# Patient Record
Sex: FEMALE | Race: WHITE | NOT HISPANIC OR LATINO | URBAN - NONMETROPOLITAN AREA
[De-identification: names, ages, dates, MRNs, and addresses within clinical notes are randomized per-mention and may not be internally consistent; named-entity substitution may affect disease eponyms.]

---

## 2020-10-20 ENCOUNTER — OFFICE VISIT (OUTPATIENT)
Dept: URBAN - NONMETROPOLITAN AREA CLINIC 13 | Facility: CLINIC | Age: 70
End: 2020-10-20
Payer: MEDICARE

## 2020-10-20 DIAGNOSIS — C16.9 GASTRIC ADENOCARCINOMA: ICD-10-CM

## 2020-10-20 PROCEDURE — G9903 PT SCRN TBCO ID AS NON USER: HCPCS | Performed by: INTERNAL MEDICINE

## 2020-10-20 PROCEDURE — 99213 OFFICE O/P EST LOW 20 MIN: CPT | Performed by: INTERNAL MEDICINE

## 2020-10-20 RX ORDER — PANTOPRAZOLE SODIUM 40 MG/1
TAKE 1 TABLET (40 MG) BY ORAL ROUTE ONCE DAILY TABLET, DELAYED RELEASE ORAL 1
Qty: 0 | Refills: 0 | Status: ACTIVE | COMMUNITY
Start: 1900-01-01 | End: 1900-01-01

## 2020-10-20 RX ORDER — LOSARTAN POTASSIUM AND HYDROCHLOROTHIAZIDE 100; 25 MG/1; MG/1
TAKE 1 TABLET BY ORAL ROUTE ONCE DAILY TABLET, FILM COATED ORAL 1
Qty: 0 | Refills: 0 | Status: ACTIVE | COMMUNITY
Start: 1900-01-01 | End: 1900-01-01

## 2020-10-20 NOTE — HPI-TODAY'S VISIT:
History Of Present Illness    Ms. Leticia Galeana is here for hx of adenocarcinoma of the stomach. In 2015, she was undergoing evaluation for gastric bypass. 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 She had a repeat EGD with EUS in September 2015 that showed a diffuse distal gastric antrum flat, laterally spreading mass. Pathology consistent with adenocarcinoma. She was referred to [facility]. She had 2/3rds of her stomach removed 1/2016. She has a CT scan every 3 months. She had a repeat EGD 2/2017 with no evidence of gastric mass or malignancy. In April 2017, she had a SBO. She is having a ventral hernia repair at [facility] next month. She eats small amounts and and denies nausea, or vomiting.  She is very active and walks 6 miles a day.  6/18/19 Leticia retruns for follow up. EGD in 2018 was clear of recurrent disease, just some mild gastritis. Today she is feeling well. She is spending the summer in [place] starting in July but wanted to get repeat surveillance EGD done prior. She is having hernia surgery later this year and dental implants as well. She feels well [id number] Leticia returns for follow-up. She has a history of gastric cancer status post resection. She has had routine endoscopy and CT scans which have been normal. She is here for  repeat EGD. She has no issues today.

## 2020-10-20 NOTE — PHYSICAL EXAM HENT:
Head,  normocephalic,  atraumatic,  Face,  Face within normal limits,  Ears,  External ears within normal limits,  Nose/Nasopharynx,  External nose  normal appearance, Lips,  Appearance normal
Bladder non-tender and non-distended.

## 2020-10-30 ENCOUNTER — OFFICE VISIT (OUTPATIENT)
Dept: URBAN - METROPOLITAN AREA MEDICAL CENTER 1 | Facility: MEDICAL CENTER | Age: 70
End: 2020-10-30
Payer: MEDICARE

## 2020-10-30 DIAGNOSIS — Z85.028 HISTORY OF GASTRIC CANCER: ICD-10-CM

## 2020-10-30 DIAGNOSIS — K31.89 ACQUIRED DEFORMITY OF DUODENUM: ICD-10-CM

## 2020-10-30 PROCEDURE — 43239 EGD BIOPSY SINGLE/MULTIPLE: CPT | Performed by: INTERNAL MEDICINE

## 2020-10-30 PROCEDURE — 43235 EGD DIAGNOSTIC BRUSH WASH: CPT | Performed by: INTERNAL MEDICINE

## 2020-12-29 ENCOUNTER — OFFICE VISIT (OUTPATIENT)
Dept: URBAN - NONMETROPOLITAN AREA CLINIC 13 | Facility: CLINIC | Age: 70
End: 2020-12-29

## 2021-01-19 ENCOUNTER — OFFICE VISIT (OUTPATIENT)
Dept: URBAN - NONMETROPOLITAN AREA CLINIC 13 | Facility: CLINIC | Age: 71
End: 2021-01-19

## 2022-02-08 ENCOUNTER — OFFICE VISIT (OUTPATIENT)
Dept: URBAN - NONMETROPOLITAN AREA CLINIC 13 | Facility: CLINIC | Age: 72
End: 2022-02-08

## 2022-03-31 ENCOUNTER — WEB ENCOUNTER (OUTPATIENT)
Dept: URBAN - NONMETROPOLITAN AREA CLINIC 13 | Facility: CLINIC | Age: 72
End: 2022-03-31

## 2022-03-31 ENCOUNTER — OFFICE VISIT (OUTPATIENT)
Dept: URBAN - NONMETROPOLITAN AREA CLINIC 13 | Facility: CLINIC | Age: 72
End: 2022-03-31
Payer: MEDICARE

## 2022-03-31 DIAGNOSIS — C16.9 GASTRIC ADENOCARCINOMA: ICD-10-CM

## 2022-03-31 DIAGNOSIS — D12.6 ADENOMATOUS POLYP OF COLON, UNSPECIFIED PART OF COLON: ICD-10-CM

## 2022-03-31 PROCEDURE — 99214 OFFICE O/P EST MOD 30 MIN: CPT | Performed by: INTERNAL MEDICINE

## 2022-03-31 RX ORDER — LOSARTAN POTASSIUM AND HYDROCHLOROTHIAZIDE 100; 25 MG/1; MG/1
TAKE 1 TABLET BY ORAL ROUTE ONCE DAILY TABLET, FILM COATED ORAL 1
Qty: 0 | Refills: 0 | Status: ACTIVE | COMMUNITY
Start: 1900-01-01 | End: 1900-01-01

## 2022-03-31 RX ORDER — PANTOPRAZOLE SODIUM 40 MG/1
TAKE 1 TABLET (40 MG) BY ORAL ROUTE ONCE DAILY TABLET, DELAYED RELEASE ORAL 1
Qty: 0 | Refills: 0 | Status: ACTIVE | COMMUNITY
Start: 1900-01-01 | End: 1900-01-01

## 2022-03-31 NOTE — HPI-TODAY'S VISIT:
History Of Present Illness    Ms. Leticia Galeana is here for hx of adenocarcinoma of the stomach. In 2015, she was undergoing evaluation for gastric bypass. 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 She had a repeat EGD with [facility] in September 2015 that showed a diffuse distal gastric antrum flat, laterally spreading mass. Pathology consistent with adenocarcinoma. She was referred to [place]. She had 2/3rds of her stomach removed 1/2016. She has a CT scan every 3 months. She had a repeat EGD 2/2017 with no evidence of gastric mass or malignancy. In April 2017, she had a SBO. She is having a ventral hernia repair at [facility] next month. She eats small amounts and and denies nausea, or vomiting.  She is very active and walks 6 miles a day.  6/18/19 Leticia retruns for follow up. EGD in 2018 was clear of recurrent disease, just some mild gastritis. Today she is feeling well. She is spending the summer in [place] starting in July but wanted to get repeat surveillance EGD done prior. She is having hernia surgery later this year and dental implants as well. She feels well [id number] Leticia returns for follow-up. She has a history of gastric cancer status post resection. She has had routine endoscopy and CT scans which have been normal. She is here for  repeat EGD. She has no issues today. 3/31/2022 Leticia returns for follow-up.  She is due for upper endoscopy for surveillance of gastric cancer.  She has a history of partial gastric resection.  Past EGDs for surveillance have been unrevealing.  She is also due for colonoscopy.  She had a colonoscopy 5 years ago with benign polyps.  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

## 2022-04-27 ENCOUNTER — CLAIMS CREATED FROM THE CLAIM WINDOW (OUTPATIENT)
Dept: URBAN - METROPOLITAN AREA CLINIC 4 | Facility: CLINIC | Age: 72
End: 2022-04-27
Payer: MEDICARE

## 2022-04-27 ENCOUNTER — OFFICE VISIT (OUTPATIENT)
Dept: URBAN - NONMETROPOLITAN AREA SURGERY CENTER 1 | Facility: SURGERY CENTER | Age: 72
End: 2022-04-27
Payer: MEDICARE

## 2022-04-27 DIAGNOSIS — K31.89 FOCAL FOVEOLAR HYPERPLASIA: ICD-10-CM

## 2022-04-27 DIAGNOSIS — Z98.0 H/O BILLROTH II OPERATION: ICD-10-CM

## 2022-04-27 DIAGNOSIS — Z12.11 COLON CANCER SCREENING: ICD-10-CM

## 2022-04-27 DIAGNOSIS — C16.9 ADENOCARCINOMA OF STOMACH: ICD-10-CM

## 2022-04-27 PROCEDURE — 43239 EGD BIOPSY SINGLE/MULTIPLE: CPT | Performed by: INTERNAL MEDICINE

## 2022-04-27 PROCEDURE — 88342 IMHCHEM/IMCYTCHM 1ST ANTB: CPT | Performed by: PATHOLOGY

## 2022-04-27 PROCEDURE — 88341 IMHCHEM/IMCYTCHM EA ADD ANTB: CPT | Performed by: PATHOLOGY

## 2022-04-27 PROCEDURE — G8907 PT DOC NO EVENTS ON DISCHARG: HCPCS | Performed by: INTERNAL MEDICINE

## 2022-04-27 PROCEDURE — G0121 COLON CA SCRN NOT HI RSK IND: HCPCS | Performed by: INTERNAL MEDICINE

## 2022-04-27 PROCEDURE — 88305 TISSUE EXAM BY PATHOLOGIST: CPT | Performed by: PATHOLOGY

## 2022-06-09 ENCOUNTER — OFFICE VISIT (OUTPATIENT)
Dept: URBAN - NONMETROPOLITAN AREA CLINIC 13 | Facility: CLINIC | Age: 72
End: 2022-06-09
Payer: MEDICARE

## 2022-06-09 VITALS
HEIGHT: 65 IN | BODY MASS INDEX: 39.95 KG/M2 | TEMPERATURE: 97.6 F | SYSTOLIC BLOOD PRESSURE: 155 MMHG | WEIGHT: 239.8 LBS | HEART RATE: 70 BPM | DIASTOLIC BLOOD PRESSURE: 76 MMHG

## 2022-06-09 DIAGNOSIS — D12.6 ADENOMATOUS POLYP OF COLON, UNSPECIFIED PART OF COLON: ICD-10-CM

## 2022-06-09 DIAGNOSIS — C16.9 GASTRIC ADENOCARCINOMA: ICD-10-CM

## 2022-06-09 PROBLEM — 408647009 ADENOCARCINOMA OF STOMACH: Status: ACTIVE | Noted: 2020-10-20

## 2022-06-09 PROCEDURE — 99213 OFFICE O/P EST LOW 20 MIN: CPT | Performed by: NURSE PRACTITIONER

## 2022-06-09 RX ORDER — PANTOPRAZOLE SODIUM 40 MG/1
TAKE 1 TABLET (40 MG) BY ORAL ROUTE ONCE DAILY TABLET, DELAYED RELEASE ORAL 1
Qty: 0 | Refills: 0 | Status: ACTIVE | COMMUNITY
Start: 1900-01-01 | End: 1900-01-01

## 2022-06-09 RX ORDER — LOSARTAN POTASSIUM AND HYDROCHLOROTHIAZIDE 100; 25 MG/1; MG/1
TAKE 1 TABLET BY ORAL ROUTE ONCE DAILY TABLET, FILM COATED ORAL 1
Qty: 0 | Refills: 0 | Status: ACTIVE | COMMUNITY
Start: 1900-01-01 | End: 1900-01-01

## 2022-06-09 NOTE — HPI-OTHER HISTORIES
History Of Present Illness    Ms. Leticia Galeana is here for hx of adenocarcinoma of the stomach. In 2015, she was undergoing evaluation for gastric bypass. 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 She had a repeat EGD with [facility] in September 2015 that showed a diffuse distal gastric antrum flat, laterally spreading mass. Pathology consistent with adenocarcinoma. She was referred to [facility]. She had 2/3rds of her stomach removed 1/2016. She has a CT scan every 3 months. She had a repeat EGD 2/2017 with no evidence of gastric mass or malignancy. In April 2017, she had a SBO. She is having a ventral hernia repair at [facility] next month. She eats small amounts and and denies nausea, or vomiting.  She is very active and walks 6 miles a day.  6/18/19 Leticia retruns for follow up. EGD in 2018 was clear of recurrent disease, just some mild gastritis. Today she is feeling well. She is spending the summer in [place] starting in July but wanted to get repeat surveillance EGD done prior. She is having hernia surgery later this year and dental implants as well. She feels well [id number] Leticia returns for follow-up. She has a history of gastric cancer status post resection. She has had routine endoscopy and CT scans which have been normal. She is here for  repeat EGD. She has no issues today. 3/31/2022 Leticia returns for follow-up.  She is due for upper endoscopy for surveillance of gastric cancer.  She has a history of partial gastric resection.  Past EGDs for surveillance have been unrevealing.  She is also due for colonoscopy.  She had a colonoscopy 5 years ago with benign polyps.  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  4/27/2022 EGD: healthy Billroth 1 gastroduodenostomy, no masses, lesions, or abnormal mucosa. Path negative Colonoscopy: diverticulosis leftsided

## 2022-06-09 NOTE — HPI-TODAY'S VISIT:
6/9/2022 Leticia returns for follow-up.  She had an upper endoscopy for surveillance of gastric cancer.  She has a history of partial gastric resection.  Past EGDs and this last one 4/2022 for surveillance have been unrevealing. She denies any abdominal pain, nausea, vomiting, change in weight or appetite. She also has a hx  oftubular adenomas with in situ carcinoma. Her colonoscopy was normal except for diverticulosis. overall, she feels well and has no GI complaints. CS

## 2023-06-22 ENCOUNTER — OFFICE VISIT (OUTPATIENT)
Dept: URBAN - NONMETROPOLITAN AREA CLINIC 13 | Facility: CLINIC | Age: 73
End: 2023-06-22
Payer: MEDICARE

## 2023-06-22 ENCOUNTER — DASHBOARD ENCOUNTERS (OUTPATIENT)
Age: 73
End: 2023-06-22

## 2023-06-22 VITALS
BODY MASS INDEX: 41.85 KG/M2 | HEART RATE: 67 BPM | WEIGHT: 251.2 LBS | DIASTOLIC BLOOD PRESSURE: 78 MMHG | SYSTOLIC BLOOD PRESSURE: 153 MMHG | HEIGHT: 65 IN

## 2023-06-22 DIAGNOSIS — C16.9 GASTRIC ADENOCARCINOMA: ICD-10-CM

## 2023-06-22 PROCEDURE — 99213 OFFICE O/P EST LOW 20 MIN: CPT | Performed by: INTERNAL MEDICINE

## 2023-06-22 RX ORDER — PANTOPRAZOLE SODIUM 40 MG/1
TAKE 1 TABLET (40 MG) BY ORAL ROUTE ONCE DAILY TABLET, DELAYED RELEASE ORAL 1
Qty: 0 | Refills: 0 | Status: ACTIVE | COMMUNITY
Start: 1900-01-01

## 2023-06-22 RX ORDER — LOSARTAN POTASSIUM AND HYDROCHLOROTHIAZIDE 100; 25 MG/1; MG/1
TAKE 1 TABLET BY ORAL ROUTE ONCE DAILY TABLET, FILM COATED ORAL 1
Qty: 0 | Refills: 0 | Status: ACTIVE | COMMUNITY
Start: 1900-01-01

## 2023-06-22 NOTE — HPI-OTHER HISTORIES
History Of Present Illness    Ms. Leticia Galeana is here for hx of adenocarcinoma of the stomach. In 2015, she was undergoing evaluation for gastric bypass. 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 She had a repeat EGD with [facility] in September 2015 that showed a diffuse distal gastric antrum flat, laterally spreading mass. Pathology consistent with adenocarcinoma. She was referred to [facility]. She had 2/3rds of her stomach removed 1/2016. She has a CT scan every 3 months. She had a repeat EGD 2/2017 with no evidence of gastric mass or malignancy. In April 2017, she had a SBO. She is having a ventral hernia repair at [facility] next month. She eats small amounts and and denies nausea, or vomiting.  She is very active and walks 6 miles a day.  6/18/19 Leticia retruns for follow up. EGD in 2018 was clear of recurrent disease, just some mild gastritis. Today she is feeling well. She is spending the summer in [place] starting in July but wanted to get repeat surveillance EGD done prior. She is having hernia surgery later this year and dental implants as well. She feels well [id number] Leticia returns for follow-up. She has a history of gastric cancer status post resection. She has had routine endoscopy and CT scans which have been normal. She is here for  repeat EGD. She has no issues today. 3/31/2022 Leticia returns for follow-up.  She is due for upper endoscopy for surveillance of gastric cancer.  She has a history of partial gastric resection.  Past EGDs for surveillance have been unrevealing.  She is also due for colonoscopy.  She had a colonoscopy 5 years ago with benign polyps.  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  4/27/2022 EGD: healthy Billroth 1 gastroduodenostomy, no masses, lesions, or abnormal mucosa. Path negative Colonoscopy: diverticulosis leftsided

## 2023-06-22 NOTE — HPI-TODAY'S VISIT:
6/9/2022 Leticia returns for follow-up.  She had an upper endoscopy for surveillance of gastric cancer.  She has a history of partial gastric resection.  Past EGDs and this last one 4/2022 for surveillance have been unrevealing. She denies any abdominal pain, nausea, vomiting, change in weight or appetite. She also has a hx  oftubular adenomas with in situ carcinoma. Her colonoscopy was normal except for diverticulosis. overall, she feels well and has no GI complaints. CS 6/22/2023 Leticia returns for follow-up.  She has a history of gastric adenocarcinoma status post resection.  She has routine surveillance imaging and endoscopy that has been clear of disease.  She recently saw Dr. Harris who released her.  She does want to continue yearly endoscopies for surveillance.  She otherwise feels well without complaints today.  She is retired and spends a lot of time in her garden and yard.

## 2023-08-02 ENCOUNTER — OFFICE VISIT (OUTPATIENT)
Dept: URBAN - NONMETROPOLITAN AREA SURGERY CENTER 1 | Facility: SURGERY CENTER | Age: 73
End: 2023-08-02
Payer: MEDICARE

## 2023-08-02 ENCOUNTER — CLAIMS CREATED FROM THE CLAIM WINDOW (OUTPATIENT)
Dept: URBAN - METROPOLITAN AREA CLINIC 4 | Facility: CLINIC | Age: 73
End: 2023-08-02
Payer: MEDICARE

## 2023-08-02 DIAGNOSIS — Z98.0 H/O BILLROTH II OPERATION: ICD-10-CM

## 2023-08-02 DIAGNOSIS — K31.89 OTHER DISEASES OF STOMACH AND DUODENUM: ICD-10-CM

## 2023-08-02 DIAGNOSIS — C16.9 ADENOCARCINOMA OF STOMACH: ICD-10-CM

## 2023-08-02 PROCEDURE — G8907 PT DOC NO EVENTS ON DISCHARG: HCPCS | Performed by: INTERNAL MEDICINE

## 2023-08-02 PROCEDURE — 88305 TISSUE EXAM BY PATHOLOGIST: CPT | Performed by: PATHOLOGY

## 2023-08-02 PROCEDURE — 43239 EGD BIOPSY SINGLE/MULTIPLE: CPT | Performed by: INTERNAL MEDICINE

## 2023-10-03 ENCOUNTER — TELEPHONE ENCOUNTER (OUTPATIENT)
Dept: URBAN - METROPOLITAN AREA CLINIC 23 | Facility: CLINIC | Age: 73
End: 2023-10-03

## 2024-10-08 ENCOUNTER — TELEPHONE ENCOUNTER (OUTPATIENT)
Dept: URBAN - NONMETROPOLITAN AREA CLINIC 13 | Facility: CLINIC | Age: 74
End: 2024-10-08

## 2025-01-02 ENCOUNTER — LAB OUTSIDE AN ENCOUNTER (OUTPATIENT)
Dept: URBAN - NONMETROPOLITAN AREA CLINIC 13 | Facility: CLINIC | Age: 75
End: 2025-01-02

## 2025-01-02 ENCOUNTER — OFFICE VISIT (OUTPATIENT)
Dept: URBAN - NONMETROPOLITAN AREA CLINIC 13 | Facility: CLINIC | Age: 75
End: 2025-01-02
Payer: MEDICARE

## 2025-01-02 VITALS
SYSTOLIC BLOOD PRESSURE: 149 MMHG | BODY MASS INDEX: 38.69 KG/M2 | WEIGHT: 232.2 LBS | DIASTOLIC BLOOD PRESSURE: 83 MMHG | HEIGHT: 65 IN | HEART RATE: 80 BPM

## 2025-01-02 DIAGNOSIS — Z80.0 FAMILY HISTORY OF COLON CANCER: ICD-10-CM

## 2025-01-02 DIAGNOSIS — D12.6 ADENOMATOUS POLYP OF COLON, UNSPECIFIED PART OF COLON: ICD-10-CM

## 2025-01-02 DIAGNOSIS — Z12.11 COLON CANCER SCREENING: ICD-10-CM

## 2025-01-02 DIAGNOSIS — C16.9 GASTRIC ADENOCARCINOMA: ICD-10-CM

## 2025-01-02 PROCEDURE — 99213 OFFICE O/P EST LOW 20 MIN: CPT | Performed by: INTERNAL MEDICINE

## 2025-01-02 RX ORDER — PANTOPRAZOLE SODIUM 40 MG/1
TAKE 1 TABLET (40 MG) BY ORAL ROUTE ONCE DAILY TABLET, DELAYED RELEASE ORAL 1
Qty: 0 | Refills: 0 | Status: ACTIVE | COMMUNITY
Start: 1900-01-01

## 2025-01-02 RX ORDER — LOSARTAN POTASSIUM AND HYDROCHLOROTHIAZIDE 100; 25 MG/1; MG/1
TAKE 1 TABLET BY ORAL ROUTE ONCE DAILY TABLET, FILM COATED ORAL 1
Qty: 0 | Refills: 0 | Status: ACTIVE | COMMUNITY
Start: 1900-01-01

## 2025-01-02 NOTE — HPI-TODAY'S VISIT:
6/9/2022 Leticia returns for follow-up.  She had an upper endoscopy for surveillance of gastric cancer.  She has a history of partial gastric resection.  Past EGDs and this last one 4/2022 for surveillance have been unrevealing. She denies any abdominal pain, nausea, vomiting, change in weight or appetite. She also has a hx  oftubular adenomas with in situ carcinoma. Her colonoscopy was normal except for diverticulosis. overall, she feels well and has no GI complaints. CS 6/22/2023 Leticia returns for follow-up.  She has a history of gastric adenocarcinoma status post resection.  She has routine surveillance imaging and endoscopy that has been clear of disease.  She recently saw Dr. Harris who released her.  She does want to continue yearly endoscopies for surveillance.  She otherwise feels well without complaints today.  She is retired and spends a lot of time in her garden and yard. 1/2/2025 Leticia returns for follow-up.  She has a history of gastric adenocarcinoma status post resection.  She has been undergoing routine surveillance endoscopies.  Last endoscopy in 2023 was normal.  She just tells me that her sister was just diagnosed with metastatic colon cancer.  She is interested in repeating colonoscopy as her sister had a colonoscopy 5 years prior that was reportedly normal.  She denies any rectal bleeding or abdominal pain.

## 2025-01-02 NOTE — HPI-OTHER HISTORIES
History Of Present Illness    Ms. Leticia Galeana is here for hx of adenocarcinoma of the stomach. In 2015, she was undergoing evaluation for gastric bypass. She had an EGD that showed inflammation in the stomach and bx were sent to 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 She had a repeat EGD with [facility] in September 2015 that showed a diffuse distal gastric antrum flat, laterally spreading mass. Pathology consistent with adenocarcinoma. She was referred to [place]. She had 2/3rds of her stomach removed 1/2016. She has a CT scan every 3 months. She had a repeat EGD 2/2017 with no evidence of gastric mass or malignancy. In April 2017, she had a SBO. She is having a ventral hernia repair at [place] next month. She eats small amounts and and denies nausea, or vomiting.  She is very active and walks 6 miles a day.  6/18/19 Leticia retruns for follow up. EGD in 2018 was clear of recurrent disease, just some mild gastritis. Today she is feeling well. She is spending the summer in [place] starting in July but wanted to get repeat surveillance EGD done prior. She is having hernia surgery later this year and dental implants as well. She feels well [id number] Leticia returns for follow-up. She has a history of gastric cancer status post resection. She has had routine endoscopy and CT scans which have been normal. She is here for  repeat EGD. She has no issues today. 3/31/2022 Leticia returns for follow-up.  She is due for upper endoscopy for surveillance of gastric cancer.  She has a history of partial gastric resection.  Past EGDs for surveillance have been unrevealing.  She is also due for colonoscopy.  She had a colonoscopy 5 years ago with benign polyps.  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  4/27/2022 EGD: healthy Billroth 1 gastroduodenostomy, no masses, lesions, or abnormal mucosa. Path negative Colonoscopy: diverticulosis leftsided

## 2025-01-02 NOTE — PHYSICAL EXAM CHEST:
unlabored without accessory muscle use,normal breath sounds You have met medical requirements to play sports through [id number].   Make eye exam appointment as soon as possible.  Make dentist appointment.  No more betel nut!    Follow injury prevention information as provided.   Seek medical care for any injuries as discussed.  Stay hydrated with at least 8 glasses of water per day and do not skip meals, have snacks in between meals.

## 2025-02-11 ENCOUNTER — CLAIMS CREATED FROM THE CLAIM WINDOW (OUTPATIENT)
Dept: URBAN - METROPOLITAN AREA CLINIC 4 | Facility: CLINIC | Age: 75
End: 2025-02-11
Payer: MEDICARE

## 2025-02-11 ENCOUNTER — CLAIMS CREATED FROM THE CLAIM WINDOW (OUTPATIENT)
Dept: URBAN - NONMETROPOLITAN AREA SURGERY CENTER 1 | Facility: SURGERY CENTER | Age: 75
End: 2025-02-11
Payer: MEDICARE

## 2025-02-11 DIAGNOSIS — K31.89 REACTIVE GASTROPATHY: ICD-10-CM

## 2025-02-11 DIAGNOSIS — Z90.3 ACQUIRED ABSENCE OF PART OF STOMACH: ICD-10-CM

## 2025-02-11 DIAGNOSIS — K57.30 DIVERTICULOSIS OF COLON: ICD-10-CM

## 2025-02-11 DIAGNOSIS — Z12.11 COLON CANCER SCREENING: ICD-10-CM

## 2025-02-11 DIAGNOSIS — K29.70 GASTRITIS, UNSPECIFIED, WITHOUT BLEEDING: ICD-10-CM

## 2025-02-11 DIAGNOSIS — Z12.11 ENCOUNTER SCREENING FOR MALIGNANT NEOPLASM OF COLON: ICD-10-CM

## 2025-02-11 DIAGNOSIS — D49.0 GASTRIC TUMOR: ICD-10-CM

## 2025-02-11 PROCEDURE — G0121 COLON CA SCRN NOT HI RSK IND: HCPCS | Performed by: CLINIC/CENTER

## 2025-02-11 PROCEDURE — 00813 ANES UPR LWR GI NDSC PX: CPT | Performed by: NURSE ANESTHETIST, CERTIFIED REGISTERED

## 2025-02-11 PROCEDURE — 88305 TISSUE EXAM BY PATHOLOGIST: CPT | Performed by: PATHOLOGY

## 2025-02-11 PROCEDURE — 43239 EGD BIOPSY SINGLE/MULTIPLE: CPT | Performed by: INTERNAL MEDICINE

## 2025-02-11 PROCEDURE — 43239 EGD BIOPSY SINGLE/MULTIPLE: CPT | Performed by: CLINIC/CENTER

## 2025-02-11 PROCEDURE — 88342 IMHCHEM/IMCYTCHM 1ST ANTB: CPT | Performed by: PATHOLOGY

## 2025-02-11 PROCEDURE — G0121 COLON CA SCRN NOT HI RSK IND: HCPCS | Performed by: INTERNAL MEDICINE

## 2025-02-11 PROCEDURE — 88341 IMHCHEM/IMCYTCHM EA ADD ANTB: CPT | Performed by: PATHOLOGY

## 2025-03-04 ENCOUNTER — OFFICE VISIT (OUTPATIENT)
Dept: URBAN - NONMETROPOLITAN AREA CLINIC 13 | Facility: CLINIC | Age: 75
End: 2025-03-04
Payer: MEDICARE

## 2025-03-04 VITALS
DIASTOLIC BLOOD PRESSURE: 82 MMHG | HEART RATE: 88 BPM | HEIGHT: 65 IN | BODY MASS INDEX: 38.82 KG/M2 | WEIGHT: 233 LBS | SYSTOLIC BLOOD PRESSURE: 143 MMHG

## 2025-03-04 DIAGNOSIS — Z80.0 FAMILY HISTORY OF COLON CANCER: ICD-10-CM

## 2025-03-04 DIAGNOSIS — Z12.11 COLON CANCER SCREENING: ICD-10-CM

## 2025-03-04 DIAGNOSIS — C16.9 GASTRIC ADENOCARCINOMA: ICD-10-CM

## 2025-03-04 DIAGNOSIS — Z86.0101 H/O ADENOMATOUS POLYP OF COLON: ICD-10-CM

## 2025-03-04 PROCEDURE — 99213 OFFICE O/P EST LOW 20 MIN: CPT | Performed by: NURSE PRACTITIONER

## 2025-03-04 RX ORDER — PANTOPRAZOLE SODIUM 40 MG/1
TAKE 1 TABLET (40 MG) BY ORAL ROUTE ONCE DAILY TABLET, DELAYED RELEASE ORAL 1
Qty: 0 | Refills: 0 | Status: ACTIVE | COMMUNITY
Start: 1900-01-01

## 2025-03-04 RX ORDER — LOSARTAN POTASSIUM AND HYDROCHLOROTHIAZIDE 100; 25 MG/1; MG/1
TAKE 1 TABLET BY ORAL ROUTE ONCE DAILY TABLET, FILM COATED ORAL 1
Qty: 0 | Refills: 0 | Status: ACTIVE | COMMUNITY
Start: 1900-01-01

## 2025-03-04 NOTE — HPI-OTHER HISTORIES
History Of Present Illness    Ms. Leticia Galeana is here for hx of adenocarcinoma of the stomach. In 2015, she was undergoing evaluation for gastric bypass. 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 She had a repeat EGD with [facility] in September 2015 that showed a diffuse distal gastric antrum flat, laterally spreading mass. Pathology consistent with adenocarcinoma. She was referred to [facility]. She had 2/3rds of her stomach removed 1/2016. She has a CT scan every 3 months. She had a repeat EGD 2/2017 with no evidence of gastric mass or malignancy. In April 2017, she had a SBO. She is having a ventral hernia repair at [facility] next month. She eats small amounts and and denies nausea, or vomiting.  She is very active and walks 6 miles a day. CS 6/18/19 Leticia retruns for follow up. EGD in 2018 was clear of recurrent disease, just some mild gastritis. Today she is feeling well. She is spending the summer in [place] starting in July but wanted to get repeat surveillance EGD done prior. She is having hernia surgery later this year and dental implants as well. She feels well [id number] Leticia returns for follow-up. She has a history of gastric cancer status post resection. She has had routine endoscopy and CT scans which have been normal. She is here for  repeat EGD. She has no issues today. 3/31/2022 Leticia returns for follow-up.  She is due for upper endoscopy for surveillance of gastric cancer.  She has a history of partial gastric resection.  Past EGDs for surveillance have been unrevealing.  She is also due for colonoscopy.  She had a colonoscopy 5 years ago with benign polyps.  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  4/27/2022 EGD: healthy Billroth 1 gastroduodenostomy, no masses, lesions, or abnormal mucosa. Path negative Colonoscopy: diverticulosis leftsided 6/9/2022 Leticia returns for follow-up. She had an upper endoscopy for surveillance of gastric cancer. She has a history of partial gastric resection. Past EGDs and this last one 4/2022 for surveillance have been unrevealing. She denies any abdominal pain, nausea, vomiting, change in weight or appetite. She also has a hx oftubular adenomas with in situ carcinoma. Her colonoscopy was normal except for diverticulosis. overall, she feels well and has no GI complaints. CS 6/22/2023 Leticia returns for follow-up. She has a history of gastric adenocarcinoma status post resection. She has routine surveillance imaging and endoscopy that has been clear of disease. She recently saw Dr. Harris who released her. She does want to continue yearly endoscopies for surveillance. She otherwise feels well without complaints today. She is retired and spends a lot of time in her garden and yard. 1/2/2025 Leticia returns for follow-up. She has a history of gastric adenocarcinoma status post resection. She has been undergoing routine surveillance endoscopies. Last endoscopy in 2023 was normal. She just tells me that her sister was just diagnosed with metastatic colon cancer. She is interested in repeating colonoscopy as her sister had a colonoscopy 5 years prior that was reportedly normal. She denies any rectal bleeding or abdominal pain.  2/11/2025 EGD: normal esophagus, evidence of an antrectomy found, normal duodenum. Path mild chemical gastropathy Colonoscopy: significiant looping of the colon

## 2025-03-04 NOTE — HPI-TODAY'S VISIT:
3/4/2025 Ms. Leticia Galeana is here for f/u of EGD for history of gastric adenocarcinoma status post resection and colon cancer screening. Her EGD showed evidence of a gastectomy with healthy mucosa. Path negative for malignancy. Her colonoscopy we was normal. She is overall doing well. She has to watch her diet- avoiding pork and beef or she will have diarrhea. OVerall, she feels well and relieved to have a normal colonoscopy. CS